# Patient Record
Sex: MALE | Race: WHITE
[De-identification: names, ages, dates, MRNs, and addresses within clinical notes are randomized per-mention and may not be internally consistent; named-entity substitution may affect disease eponyms.]

---

## 2019-09-04 ENCOUNTER — HOSPITAL ENCOUNTER (OUTPATIENT)
Dept: HOSPITAL 47 - RADUSWWP | Age: 64
Discharge: HOME | End: 2019-09-04
Attending: FAMILY MEDICINE
Payer: SELF-PAY

## 2019-09-04 DIAGNOSIS — K76.0: Primary | ICD-10-CM

## 2019-09-04 DIAGNOSIS — I10: ICD-10-CM

## 2019-09-04 PROCEDURE — 76700 US EXAM ABDOM COMPLETE: CPT

## 2019-09-04 NOTE — US
EXAMINATION TYPE: US abdomen complete

 

DATE OF EXAM: 9/4/2019

 

COMPARISON: NONE

 

CLINICAL HISTORY: R94.4 abnormal results of kidney function, I10.

 

EXAM MEASUREMENTS:

 

Liver Length:  12.4 cm   

Gallbladder Wall:  0.2 cm   

CBD:  0.4 cm

Spleen:  10.9 cm   

Right Kidney:  10.6 x 5.4 x 5.6 cm 

Left Kidney:  10.0 x 5.3 x 5.2 cm   

 

 

 

Pancreas:  not well visualized due to midline bowel gas

Liver: There is slightly hyperechoic echotexture throughout.

Gallbladder:  No stones seen

**Evidence for sonographic Porter's sign:  No

CBD:  wnl 

Spleen:  wnl   

Right Kidney:  No hydronephrosis or masses seen   

Left Kidney:  No hydronephrosis or masses seen   

Upper IVC:  wnl  

Abd Aorta:  wnl

 

 

 

The liver is homogenous.  The intrahepatic portion of the IVC and proximal abdominal aorta are within
 normal limits.  There is no evidence of cholelithiasis.  Common bile duct is unremarkable.  The visu
alized portions of the pancreas are homogenous.  The spleen is unremarkable.  Kidneys are symmetric a
nd free of hydronephrosis.  No renal lesions are seen. Cortical medullary differentiation is maintain
ed.

 

IMPRESSION: 

1. No hydronephrosis or nephrolithiasis. No sonographic evidence of chronic medical renal disease.

2. Slightly hyperechoic hepatic echotexture throughout that can be seen in mild hepatic steatosis. Co
rrelate with liver function tests.

## 2022-08-02 ENCOUNTER — HOSPITAL ENCOUNTER (OUTPATIENT)
Dept: HOSPITAL 47 - RADECHMAIN | Age: 67
Discharge: HOME | End: 2022-08-02
Attending: FAMILY MEDICINE
Payer: COMMERCIAL

## 2022-08-02 DIAGNOSIS — I10: ICD-10-CM

## 2022-08-02 DIAGNOSIS — I08.2: Primary | ICD-10-CM

## 2022-08-02 DIAGNOSIS — E78.2: ICD-10-CM

## 2022-08-02 PROCEDURE — 93306 TTE W/DOPPLER COMPLETE: CPT

## 2022-08-03 NOTE — CA
Transthoracic Echo Report 

 Name: Triston Dc 

 MRN:    [id number] 

 Age:    67     Gender:     M 

 

 :    1955 

 Exam Date:     2022 13:47 

 Exam Location: [facility] 

 Ht (in):     68     Wt (lb):     205 

 Ordering Physician:        Marc Villegas DO 

 Attending/Referring Phys:         Nimo Palafox  

                                   PAC 

 Technician         Jody Ordoñez RDCS 

 Procedure CPT: 

 Indications:       I10 HYPERTENSION, E78.2 Mixed hyperlipidemia 

 

 Cardiac Hx: 

 Technical Quality:      Fair 

 Contrast 1:                                Total Dose (mL): 

 Contrast 2:                                Total Dose (mL): 

 

 MEASUREMENTS  (Male / Female) Normal Values 

 2D ECHO 

 LV Diastolic Diameter PLAX        4.3 cm                4.2 - 5.9 / 3.9 - 5.3 cm 

 LV Systolic Diameter PLAX         2.4 cm                 

 IVS Diastolic Thickness           1.4 cm                0.6 - 1.0 / 0.6 - 0.9 cm 

 LVPW Diastolic Thickness          1.5 cm                0.6 - 1.0 / 0.6 - 0.9 cm 

 LV Relative Wall Thickness        0.7                    

 RV Internal Dim ED PLAX           3.1 cm                 

 LA Volume                         50.5 cm???              18 - 58 / 22 - 52 cm??? 

 

 M-MODE 

 Aortic Root Diameter MM           3.0 cm                 

 LA Systolic Diameter MM           4.1 cm                 

 LA Ao Ratio MM                    1.4                    

 AV Cusp Separation MM             2.0 cm                 

 

 DOPPLER 

 AV Peak Velocity                  161.9 cm/s             

 AV Peak Gradient                  10.5 mmHg              

 LVOT Peak Velocity                151.3 cm/s             

 LVOT Peak Gradient                9.2 mmHg               

 MV Area PHT                       3.4 cm???                

 Mitral E Point Velocity           106.1 cm/s             

 Mitral A Point Velocity           88.3 cm/s              

 Mitral E to A Ratio               1.2                    

 MV Deceleration Time              221.3 ms               

 TR Peak Velocity                  183.6 cm/s             

 TR Peak Gradient                  13.5 mmHg              

 Right Ventricular Systolic Press  18.5 mmHg              

 

 

 FINDINGS 

 Left Ventricle 

 Moderately increased left ventricular wall thickness. Normal left ventricular  

 systolic function with no obvious regional wall motion abnormalities. Left  

 ventricular ejection fraction is estimated at 55-60 %. Normal left ventricular  

 diastolic filling pattern. 

 

 Right Ventricle 

 Normal right ventricular size and function. Right ventricular systolic pressure  

 within normal limits. 

 

 Right Atrium 

 Normal right atrial size. 

 

 Left Atrium 

 Normal left atrial size. No evidence for an atrial septal defect. 

 

 Mitral Valve 

 Structurally normal mitral valve. Trace to mild mitral regurgitation. 

 

 Aortic Valve 

 Aortic valve sclerosis. No aortic stenosis. No aortic regurgitation. 

 

 Tricuspid Valve 

 Mild tricuspid regurgitation. 

 

 Pulmonic Valve 

 Trace pulmonic regurgitation. 

 

 Pericardium 

 No pericardial effusion. 

 

 Aorta 

 Normal size aortic root and proximal ascending aorta. 

 

 CONCLUSIONS 

 Left ventricular hypertrophy with preserved systolic function 

 Calcific noncoronary aortic cusp without any significant stenosis 

 Previewed by:  

 Dr. Jeff Flores MD 

 (Electronically Signed) 

 Final Date:      2022 09:03

## 2025-03-18 ENCOUNTER — HOSPITAL ENCOUNTER (EMERGENCY)
Dept: HOSPITAL 47 - EC | Age: 70
Discharge: HOME | End: 2025-03-18
Payer: COMMERCIAL

## 2025-03-18 VITALS
TEMPERATURE: 99.6 F | RESPIRATION RATE: 16 BRPM | SYSTOLIC BLOOD PRESSURE: 182 MMHG | DIASTOLIC BLOOD PRESSURE: 93 MMHG | HEART RATE: 85 BPM

## 2025-03-18 DIAGNOSIS — J10.1: ICD-10-CM

## 2025-03-18 DIAGNOSIS — Z11.52: ICD-10-CM

## 2025-03-18 DIAGNOSIS — I10: Primary | ICD-10-CM

## 2025-03-18 LAB
ALBUMIN SERPL-MCNC: 4.8 G/DL (ref 3.5–5)
ALP SERPL-CCNC: 111 U/L (ref 38–126)
ALT SERPL-CCNC: 57 U/L (ref 4–49)
ANION GAP SERPL CALC-SCNC: 16 MMOL/L
AST SERPL-CCNC: 88 U/L (ref 17–59)
BASOPHILS # BLD AUTO: 0 K/UL (ref 0–0.2)
BASOPHILS NFR BLD AUTO: 0 %
BUN SERPL-SCNC: 20 MG/DL (ref 9–20)
CALCIUM SPEC-MCNC: 9.4 MG/DL (ref 8.4–10.2)
CHLORIDE SERPL-SCNC: 101 MMOL/L (ref 98–107)
CO2 SERPL-SCNC: 20 MMOL/L (ref 22–30)
EOSINOPHIL # BLD AUTO: 0 K/UL (ref 0–0.7)
EOSINOPHIL NFR BLD AUTO: 1 %
ERYTHROCYTE [DISTWIDTH] IN BLOOD BY AUTOMATED COUNT: 5.09 M/UL (ref 4.3–5.9)
ERYTHROCYTE [DISTWIDTH] IN BLOOD: 13.6 % (ref 11.5–15.5)
GLUCOSE SERPL-MCNC: 206 MG/DL (ref 74–99)
HCT VFR BLD AUTO: 48.1 % (ref 39–53)
HGB BLD-MCNC: 15.6 GM/DL (ref 13–17.5)
LYMPHOCYTES # SPEC AUTO: 0.6 K/UL (ref 1–4.8)
LYMPHOCYTES NFR SPEC AUTO: 11 %
MCH RBC QN AUTO: 30.7 PG (ref 25–35)
MCHC RBC AUTO-ENTMCNC: 32.5 G/DL (ref 31–37)
MCV RBC AUTO: 94.5 FL (ref 80–100)
MONOCYTES # BLD AUTO: 0.4 K/UL (ref 0–1)
MONOCYTES NFR BLD AUTO: 7 %
NEUTROPHILS # BLD AUTO: 3.9 K/UL (ref 1.3–7.7)
NEUTROPHILS NFR BLD AUTO: 78 %
PLATELET # BLD AUTO: 210 K/UL (ref 150–450)
POTASSIUM SERPL-SCNC: 3.9 MMOL/L (ref 3.5–5.1)
PROT SERPL-MCNC: 8 G/DL (ref 6.3–8.2)
SODIUM SERPL-SCNC: 137 MMOL/L (ref 137–145)
WBC # BLD AUTO: 5 K/UL (ref 3.8–10.6)

## 2025-03-18 PROCEDURE — 80053 COMPREHEN METABOLIC PANEL: CPT

## 2025-03-18 PROCEDURE — 96374 THER/PROPH/DIAG INJ IV PUSH: CPT

## 2025-03-18 PROCEDURE — 93005 ELECTROCARDIOGRAM TRACING: CPT

## 2025-03-18 PROCEDURE — 71046 X-RAY EXAM CHEST 2 VIEWS: CPT

## 2025-03-18 PROCEDURE — 87636 SARSCOV2 & INF A&B AMP PRB: CPT

## 2025-03-18 PROCEDURE — 99284 EMERGENCY DEPT VISIT MOD MDM: CPT

## 2025-03-18 PROCEDURE — 96375 TX/PRO/DX INJ NEW DRUG ADDON: CPT

## 2025-03-18 PROCEDURE — 85025 COMPLETE CBC W/AUTO DIFF WBC: CPT

## 2025-03-18 PROCEDURE — 36415 COLL VENOUS BLD VENIPUNCTURE: CPT

## 2025-03-18 RX ADMIN — DEXTROSE STA MG: 50 INJECTION, SOLUTION INTRAVENOUS at 21:17

## 2025-03-18 RX ADMIN — LABETALOL HYDROCHLORIDE STA MG: 5 INJECTION, SOLUTION INTRAVENOUS at 21:19

## 2025-03-18 NOTE — XR
EXAMINATION TYPE: XR chest 2V

 

DATE OF EXAM: 3/18/2025 7:29 PM

 

COMPARISON: None.

 

CLINICAL INDICATION: Male, 69 years old with history of uri; H

 

TECHNIQUE: XR chest 2V Frontal and lateral views of the chest.

 

FINDINGS: 

Lungs/Pleura: There is no evidence of pleural effusion, focal consolidation, or pneumothorax.  

Pulmonary vascularity: Unremarkable.

Heart/mediastinum: Cardiomediastinal silhouette is unremarkable.

Musculoskeletal: No acute osseous pathology.

 

Other findings: None

 

 

IMPRESSION: 

No acute cardiopulmonary disease/process.

 

 

 

[facility], Workstation: [id number], 3/18/2025 7:29 PM

## 2025-03-18 NOTE — ED
General Adult HPI





- General


Chief complaint: Upper Respiratory Infection


Stated complaint: coughing abd labs


Time Seen by Provider: 03/18/25 18:43


Source: patient


Mode of arrival: ambulatory


Limitations: no limitations





- History of Present Illness


Initial comments: 


Dictation was produced using dragon dictation software. please excuse any 

grammatical, word or spelling errors. 











Chief Complaint: 69-year-old male presents emergency department for hypertension

and URI





History of Present Illness: Patient 69-year-old male he was redirected from 

urgent care for URI symptoms associated with hypertension.  Patient has history 

of hypertension has been without his hypertensive medications for the last 5 

days.  Patient takes multiple hypertensive medications including amlodipine, 

losartan and metoprolol.  Some these medications allegedly have not been filled 

for several weeks.  Patient went to the urgent care and was found to have 

elevated blood pressure.  Systolics exceeded 200 mmHg.  Patient has been taking 

cold and flu medication which include pseudoephedrine.  Denies any headache.  No

chest pain.  No shortness of breath or strokelike symptoms.








The ROS documented in this emergency department record has been reviewed and 

confirmed by me.  Those systems with pertinent positive or negative responses 

have been documented in the HPI.  All other systems are other negative and/or 

noncontributory.




















- Related Data


                                Home Medications











 Medication  Instructions  Recorded  Confirmed


 


Atorvastatin [Lipitor] 20 mg PO HS 03/18/25 03/18/25


 


Glimepiride [Amaryl] 2 mg PO BID 03/18/25 03/18/25


 


Losartan Potassium 100 mg PO DAILY 03/18/25 03/18/25


 


Metoprolol Succinate (ER) [Toprol 75 mg PO DAILY 03/18/25 03/18/25





Xl]   


 


Tamsulosin [Flomax] 0.4 mg PO HS 03/18/25 03/18/25


 


amLODIPine [Norvasc] 10 mg PO DAILY 03/18/25 03/18/25


 


metFORMIN HCL 1,000 mg PO BID 03/18/25 03/18/25











                                    Allergies











Allergy/AdvReac Type Severity Reaction Status Date / Time


 


No Known Allergies Allergy   Verified 03/18/25 18:59














Review of Systems


ROS Statement: 


Those systems with pertinent positive or pertinent negative responses have been 

documented in the HPI.





ROS Other: All systems not noted in ROS Statement are negative.





Past Medical History


Past Medical History: Diabetes Mellitus, Hypertension


History of Any Multi-Drug Resistant Organisms: None Reported


Past Surgical History: No Surgical Hx Reported


Past Psychological History: No Psychological Hx Reported


Smoking Status: Never smoker


Past Alcohol Use History: None Reported


Past Drug Use History: None Reported





General Exam





- General Exam Comments


Initial Comments: 











PHYSICAL EXAM:


General Impression: Alert and oriented x3, not in acute distress


HEENT: Normocephalic atraumatic, extra-ocular movements intact, pupils equal and

 reactive to light bilaterally, mucous membranes moist.


Cardiovascular: Heart regular rate and rhythm


Chest: Able to complete full sentences, no retractions, no tachypnea


Abdomen: abdomen soft, non-tender, non-distended, no organomegaly


Musculoskeletal: Pulses present and equal in all extremities, no peripheral 

edema


Motor:  no focal deficits noted


Neurological: CN II-XII grossly intact, no focal motor or sensory deficits noted


Skin: Intact with no visualized rashes


Psych: Normal affect and mood











Limitations: no limitations





Course


                                   Vital Signs











  03/18/25 03/18/25





  18:37 18:59


 


Temperature 98.0 F 


 


Pulse Rate 114 H 


 


Respiratory 15 





Rate  


 


Blood Pressure 224/96 172/92


 


O2 Sat by Pulse 97 99





Oximetry  














EKG Findings





- EKG Comments:


EKG Findings:: My EKG interpretation: Ventricular rate 99, sinus rhythm,.  179, 

QRS 84, QTc 372. No NC prolongation, no QTC prolongation, no ST or T-wave 

changes noted.  Overall, this EKG is unremarkable





Medical Decision Making





- Medical Decision Making


Was pt. sent in by a medical professional or institution ([facility], NP, urgent 

care, hospital, or nursing home...) When possible be specific


@  -No


Did you speak to anyone other than the patient for history (EMS, parent, family,

 police, friend...)? What history was obtained from this source 


@  -No


Did you review nursing and triage notes (agree or disagree)?  Why? 


@  -I reviewed and agree with nursing and triage notes


Were old charts reviewed (outside hosp., previous admission, EMS record, old 

EKG, old radiological studies, urgent care reports/EKG's, nursing home records)?

 Report findings 


@  -No old charts were reviewed


Differential Diagnosis (chest pain, altered mental status, abdominal pain women,

 abdominal pain men, vaginal bleeding, musculoskeletal, weakness, fever, dys

pnea, syncope, headache, dizziness, GI bleed, back pain, seizure, CVA, 

palpatations, mental health)? 


@  -Differential Dyspnea:


Coronary syndrome, arrhythmia, tamponade, asthma, COPD, pulmonary embolism, 

pneumonia, pneumothorax, pulmonary effusion, anaphylaxis, diabetic ketoacidosis,

 flailed chest, pulmonary contusion, diaphragmatic rupture, anemia, 

neuromuscular, this is not meant to be an all-inclusive list. 





EKG interpreted by me (3pts min.).


@  -See above


X-rays interpreted by me (1pt min.).


@  -Chest x-ray shows no acute processes


CT interpreted by me (1pt min.).


@  -None done


U/S interpreted by me (1pt. min.).


@  -None done


What testing was considered but not performed or refused? (CT, X-rays, U/S, 

labs)? Why?


@  -None


What meds were considered but not given or refused? Why?


@  -None


Was smoking cessation discussed for >3mins.?


@  -No


Were there social determinants of health that impacted care today? How? 

(Homelessness, low income, unemployed, alcoholism, drug addiction, transpo

rtation, low edu. Level, literacy, decrease access to med. care, [facility], rehab)?


@  -No


Was there de-escalation of care discussed even if they declined (Discuss DNR or 

withdrawal of care, Hospice)? DNR status


@  -No


What co-morbidities impacted this encounter? (DM, HTN, Smoking, COPD, CAD, 

Cancer, CVA, ARF, Chemo, Hep., AIDS, mental health diagnosis, sleep apnea, 

morbid obesity)?


@  -Hypertension


Was patient admitted / discharged? Hospital course, mention meds given and 

route, prescriptions, significant lab abnormalities, going to OR and other 

pertinent info.


@  -69-year-old male with history of hypertension presents to the emergency 

department for 5 days of URI symptoms along with hypertension.  Is redirected to

 us from the urgent care.  Vital signs upon arrival shows a blood pressure of 22

4/96.  Repeat blood pressure 172/92.  Heart rate is 114.  Patient has been 

taking pseudoephedrine containing cold medications.  Patient well-appearing in 

no acute distress.  Laboratory evaluation obtained.  CBC negative.  Metabolic 

panel within acceptable limits.  Patient influenza A positive.  Outside the 

Tamiflu window.  Patient given labetalol IV push for blood pressure along with 

Decadron per patient request.  Patient has blood pressure medications at home 

that he is advised to take.  To follow-up closely with his primary care doctor.


Did you discuss the management of the patient with other professionals 

(professionals i.e. , PA, NP, lab, RT, psych nurse, , , 

teacher, , )? Give summary


@  -No


Was critical care preformed (if so, how long)?


@  -No


Undiagnosed new problem with uncertain prognosis?


@  -No


Drug Therapy requiring intensive monitoring for toxicity (Heparin, Nitro, 

Insulin, Cardizem)?


@  -No


Were any procedures done?


@  -No


Diagnosis/symptom?  Acute, or Chronic, or Acute on Chronic?  Uncomplicated 

(without systemic symptoms) or Complicated (systemic symptoms)?


@  -Influenza A


Side effects of treatment?


@  -No


Exacerbation, Progression, or Severe Exacerbation?


@  -No


Poses a threat to life or bodily function? How? (Chest pain, USA, MI, pneumonia,

 PE, COPD, DKA, ARF, appy, cholecystitis, CVA, Diverticulitis, Homicidal, 

Suicidal, threat to staff... and all critical care pts)


@  -No











- Lab Data


Result diagrams: 


                                 03/18/25 18:58





                                 03/18/25 18:58


                                   Lab Results











  03/18/25 03/18/25 03/18/25 Range/Units





  18:58 18:58 18:58 


 


WBC  5.0    (3.8-10.6)  k/uL


 


RBC  5.09    (4.30-5.90)  m/uL


 


Hgb  15.6    (13.0-17.5)  gm/dL


 


Hct  48.1    (39.0-53.0)  %


 


MCV  94.5    (80.0-100.0)  fL


 


MCH  30.7    (25.0-35.0)  pg


 


MCHC  32.5    (31.0-37.0)  g/dL


 


RDW  13.6    (11.5-15.5)  %


 


Plt Count  210    (150-450)  k/uL


 


MPV  7.8    


 


Neutrophils %  78    %


 


Lymphocytes %  11    %


 


Monocytes %  7    %


 


Eosinophils %  1    %


 


Basophils %  0    %


 


Neutrophils #  3.9    (1.3-7.7)  k/uL


 


Lymphocytes #  0.6 L    (1.0-4.8)  k/uL


 


Monocytes #  0.4    (0-1.0)  k/uL


 


Eosinophils #  0.0    (0-0.7)  k/uL


 


Basophils #  0.0    (0-0.2)  k/uL


 


Sodium   137   (137-145)  mmol/L


 


Potassium   3.9   (3.5-5.1)  mmol/L


 


Chloride   101   ()  mmol/L


 


Carbon Dioxide   20 L   (22-30)  mmol/L


 


Anion Gap   16   mmol/L


 


BUN   20   (9-20)  mg/dL


 


Creatinine   1.45 H   (0.66-1.25)  mg/dL


 


Est GFR (CKD-EPI)AfAm   57   (>60 ml/min/1.73 sqM)  


 


Est GFR (CKD-EPI)NonAf   49   (>60 ml/min/1.73 sqM)  


 


Glucose   206 H   (74-99)  mg/dL


 


Calcium   9.4   (8.4-10.2)  mg/dL


 


Total Bilirubin   1.6 H   (0.2-1.3)  mg/dL


 


AST   88 H   (17-59)  U/L


 


ALT   57 H   (4-49)  U/L


 


Alkaline Phosphatase   111   ()  U/L


 


Total Protein   8.0   (6.3-8.2)  g/dL


 


Albumin   4.8   (3.5-5.0)  g/dL


 


Influenza Type A (PCR)    Detected A  (Not Detectd)  


 


Influenza Type B (PCR)    Not Detected  (Not Detectd)  


 


RSV (PCR)    Not Detected  (Not Detectd)  


 


SARS-CoV-2 (PCR)    Not Detected  (Not Detectd)  














Disposition


Clinical Impression: 


 Influenza A





Disposition: HOME SELF-CARE


Condition: Fair


Instructions (If sedation given, give patient instructions):  Influenza (ED)


Is patient prescribed a controlled substance at d/c from ED?: No


Referrals: 


Marc Villegas DO [Primary Care Provider] - 1-2 days


Time of Disposition: 20:49